# Patient Record
Sex: MALE | URBAN - METROPOLITAN AREA
[De-identification: names, ages, dates, MRNs, and addresses within clinical notes are randomized per-mention and may not be internally consistent; named-entity substitution may affect disease eponyms.]

---

## 2021-02-24 ENCOUNTER — APPOINTMENT (OUTPATIENT)
Dept: URBAN - METROPOLITAN AREA CLINIC 254 | Age: 26
Setting detail: DERMATOLOGY
End: 2021-02-24

## 2021-02-24 VITALS — HEIGHT: 73 IN | RESPIRATION RATE: 16 BRPM | WEIGHT: 225 LBS

## 2021-02-24 DIAGNOSIS — Q819 OTHER SPECIFIED ANOMALIES OF SKIN: ICD-10-CM

## 2021-02-24 DIAGNOSIS — L259 CONTACT DERMATITIS AND OTHER ECZEMA, UNSPECIFIED CAUSE: ICD-10-CM

## 2021-02-24 DIAGNOSIS — Q828 OTHER SPECIFIED ANOMALIES OF SKIN: ICD-10-CM

## 2021-02-24 DIAGNOSIS — Q826 OTHER SPECIFIED ANOMALIES OF SKIN: ICD-10-CM

## 2021-02-24 PROBLEM — L23.9 ALLERGIC CONTACT DERMATITIS, UNSPECIFIED CAUSE: Status: ACTIVE | Noted: 2021-02-24

## 2021-02-24 PROBLEM — L85.8 OTHER SPECIFIED EPIDERMAL THICKENING: Status: ACTIVE | Noted: 2021-02-24

## 2021-02-24 PROCEDURE — OTHER COUNSELING: OTHER

## 2021-02-24 PROCEDURE — OTHER PRESCRIPTION: OTHER

## 2021-02-24 PROCEDURE — 99204 OFFICE O/P NEW MOD 45 MIN: CPT

## 2021-02-24 PROCEDURE — OTHER MIPS QUALITY: OTHER

## 2021-02-24 PROCEDURE — OTHER ADDITIONAL NOTES: OTHER

## 2021-02-24 RX ORDER — TRIAMCINOLONE ACETONIDE 0.25 MG/G
CREAM TOPICAL BID
Qty: 1 | Refills: 1 | Status: ERX | COMMUNITY
Start: 2021-02-24

## 2021-02-24 ASSESSMENT — LOCATION SIMPLE DESCRIPTION DERM
LOCATION SIMPLE: RIGHT UPPER ARM
LOCATION SIMPLE: RIGHT UPPER BACK
LOCATION SIMPLE: LEFT UPPER ARM

## 2021-02-24 ASSESSMENT — LOCATION DETAILED DESCRIPTION DERM
LOCATION DETAILED: LEFT ANTERIOR PROXIMAL UPPER ARM
LOCATION DETAILED: RIGHT SUPERIOR UPPER BACK
LOCATION DETAILED: RIGHT ANTERIOR PROXIMAL UPPER ARM

## 2021-02-24 ASSESSMENT — LOCATION ZONE DERM
LOCATION ZONE: ARM
LOCATION ZONE: TRUNK

## 2021-02-24 ASSESSMENT — PAIN INTENSITY VAS: HOW INTENSE IS YOUR PAIN 0 BEING NO PAIN, 10 BEING THE MOST SEVERE PAIN POSSIBLE?: NO PAIN

## 2021-02-24 ASSESSMENT — BSA RASH: BSA RASH: 10

## 2021-02-24 ASSESSMENT — SEVERITY ASSESSMENT: SEVERITY: MILD TO MODERATE

## 2021-02-24 NOTE — HPI: RASH
What Type Of Note Output Would You Prefer (Optional)?: Standard Output
How Severe Is Your Rash?: moderate
Is This A New Presentation, Or A Follow-Up?: Rash
Additional History: He began applying Eucerin to his arms to help moisturize due to a new tattoo about 1 month ago. When he switched to Lubriderm moisturizer daily the bumps and redness seemed to improve slightly. He has a swollen rotator cuff and is not sure if the rash is related to this issue.

## 2021-02-24 NOTE — PROCEDURE: ADDITIONAL NOTES
Additional Notes: Discussed the rash is most likely due to something his skin came into contact with. Recommend he avoid applying Eucerin and products containing petroleum.
Render Risk Assessment In Note?: no
Detail Level: Simple

## 2024-12-23 ENCOUNTER — LAB (OUTPATIENT)
Dept: LAB | Facility: CLINIC | Age: 29
End: 2024-12-23

## 2024-12-23 ENCOUNTER — DOCUMENTATION ONLY (OUTPATIENT)
Dept: MATERNAL FETAL MEDICINE | Facility: CLINIC | Age: 29
End: 2024-12-23

## 2024-12-23 DIAGNOSIS — Z31.440 ENCOUNTER OF MALE FOR TESTING FOR GENETIC DISEASE CARRIER STATUS FOR PROCREATIVE MANAGEMENT: Primary | ICD-10-CM

## 2024-12-23 DIAGNOSIS — Z31.440 ENCOUNTER OF MALE FOR TESTING FOR GENETIC DISEASE CARRIER STATUS FOR PROCREATIVE MANAGEMENT: ICD-10-CM

## 2024-12-23 PROCEDURE — 36415 COLL VENOUS BLD VENIPUNCTURE: CPT

## 2024-12-23 NOTE — PROGRESS NOTES
[facility]  Genetic Counseling Consult     Patient:  Olivier Shultz Jr.  YOB: 1995   Date of Service:  12/23/2024        Olivier was seen at the [facility] for genetic consultation to discuss the option of carrier screening with his partner, Maia.         Impression/Plan:   1. Olivier elected to pursue expanded carrier screening as part of his partner, Maia's ongoing pregnancy management. A sample was drawn today and sent to [facility] laboratory for 267 conditions. Results are expected within 2-3 weeks, and will be released in [employer] We will contact him and Maia to discuss the results. Miaa and Olivier provided verbal permission for results to be left on voicemail. Authorization to share protected health information was signed today for us to discuss results with his partner.     Expanded carrier screening for mutations in a large panel of genes associated with autosomal recessive conditions including cystic fibrosis, thalassemias, hearing loss, spinal muscular atrophy, and others, is now available. We also reviewed that expanded carrier screening can assess for common X-linked recessive disorders such as Fragile X syndrome.      We discussed that expanded carrier screening is designed to identify carrier status for conditions that are primarily childhood or adolescent onset. Expanded carrier screening does not evaluate for adult-onset conditions such as hereditary cancer syndromes, dementia/ Alzheimer's disease, or cardiovascular disease risk factors. Additionally, expanded carrier screening is not comprehensive for all known genetic diseases or inherited conditions. It will not intentionally screen for autosomal dominant conditions. This is a screening test, and residual carrier status risk figures will be provided to the patient after results become available. Carrier screening is not meant to diagnose the patient  with a condition, and generally carriers are asymptomatic. However, certain genes may confer increased risks for various health concerns in carriers (including, but not limited to: REY, DMD, FMR1).     We discussed that carrier screening can have implications for other family members and that couples are encouraged to share positive results with siblings and other family members of reproductive age. Additionally, even if there is not a high reproductive risk for a condition, it is possible that carrier status can be passed on to future generations.     We reviewed that carrier screening will report on variants classified by the lab as pathogenic or likely pathogenic. Although carrier status does not change over time, it is possible that a variant could be reclassified as more information about the variant is learned. If this occurs, the couple will be contacted and a new risk assessment will be provided.         It was a pleasure to be involved with Olivier's care. Face-to-face time of the meeting was 45 minutes.     Dirk Crespo MS, [facility]  Board Certified and [place] Licensed Genetic Counselor  [facility]  Maternal Fetal Medicine  Office: [phone]  [facility]: [phone]   Fax: [phone]

## 2025-01-07 ENCOUNTER — TELEPHONE (OUTPATIENT)
Dept: MATERNAL FETAL MEDICINE | Facility: CLINIC | Age: 30
End: 2025-01-07

## 2025-01-07 NOTE — TELEPHONE ENCOUNTER
January 7, 2025    I called Olivier and left a voicemail to discuss the couple's expanded carrier screening results (Myriad Universal Plus 272 conditions). Olivier was found to be a carrier for ONE mild condition on the panel, described below. Maia was found to be a carrier for NO conditions on the panel, described below. Overall, Maia and Olivier were not found to be a carrier for the same condition.     Most of the conditions on the carrier screening panel are inherited in an autosomal recessive fashion. Every individual has two copies of the gene that is responsible for this condition, and if someone has a change or mutation that impacts how one copy of the gene functions, they are called a carrier for the condition.  If someone has two copies of the gene that have a harmful change, they are affected with the condition.  If two people who are carriers for the same condition have children, there is a chance for their children to be affected.  Each parent has a 50% chance for passing on their copy of the gene with a mutation, so there is a 25% chance for each pregnancy to get two copies of the mutation and be affected, a 50% chance for each pregnancy to be an unaffected carrier, and a 25% chance to be unaffected with two normal copies of the gene.    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   Early symptoms are nonspecific and can include join pain, abdominal pain, and fatigue. Later symptoms include arthritis, skin discoloration, liver disease, diabetes, and heart disease. Prognosis depends on extent of organ damage and various lifestyle factors such as amount of iron in the diet and alcohol use.  This is a low penetrance variant and some  individuals with this variant and another pathogenic variant have no signs or symptoms of the condition. Carriers are not typically expected to have symptoms of this condition.  Since Maia was NOT identified to be a carrier of this condition, the residual reproductive risk is 1 in 1200.       No further screening or testing for the couple or a future pregnancy is indicated.  Again, while the chances of the aforementioned conditions are low, because the detection rate of testing is not 100%, if there is ever concern for symptoms in the couple's children in the future, referral to an appropriate practitioner for evaluation is recommended.       The couple's children will have a 50% chance of being an unaffected carrier of the aforementioned conditions.  Genetic counseling for the couple's children is recommended when they are of reproductive age.    We discussed that Maia and Olivier can share this information with relatives if they feel comfortable. Siblings would be at a 50% chance of also being a carrier for the same condition.     A copy of this result will be available in Epic.     Thank you and please feel free to reach out with any questions.     Dirk Crespo MS, [facility]  Board Certified and [place] Licensed Genetic Counselor  [facility]  Maternal Fetal Medicine  Office: [phone]  [facility]: [phone]   Fax: [phone]

## 2025-01-14 LAB — SCANNED LAB RESULT: ABNORMAL
